# Patient Record
Sex: FEMALE | Race: WHITE | NOT HISPANIC OR LATINO | Employment: FULL TIME | URBAN - METROPOLITAN AREA
[De-identification: names, ages, dates, MRNs, and addresses within clinical notes are randomized per-mention and may not be internally consistent; named-entity substitution may affect disease eponyms.]

---

## 2024-03-06 ENCOUNTER — OFFICE VISIT (OUTPATIENT)
Dept: URGENT CARE | Facility: CLINIC | Age: 29
End: 2024-03-06

## 2024-03-06 VITALS
OXYGEN SATURATION: 98 % | WEIGHT: 293 LBS | BODY MASS INDEX: 43.4 KG/M2 | RESPIRATION RATE: 18 BRPM | SYSTOLIC BLOOD PRESSURE: 176 MMHG | HEIGHT: 69 IN | TEMPERATURE: 99 F | DIASTOLIC BLOOD PRESSURE: 98 MMHG | HEART RATE: 69 BPM

## 2024-03-06 DIAGNOSIS — K80.50 BILIARY COLIC: ICD-10-CM

## 2024-03-06 DIAGNOSIS — R10.11 RIGHT UPPER QUADRANT ABDOMINAL PAIN: Primary | ICD-10-CM

## 2024-03-06 PROCEDURE — 99213 OFFICE O/P EST LOW 20 MIN: CPT | Mod: S$GLB,,, | Performed by: FAMILY MEDICINE

## 2024-03-06 NOTE — PATIENT INSTRUCTIONS
Recommend you eat light and use ibuprofen if pain is mild. If your pain returns and is severe then would recommend you proceed to the ER for blood work and an ultrasound

## 2024-03-06 NOTE — PROGRESS NOTES
"Subjective:      Patient ID: Liam Philip is a 28 y.o. female.    Vitals:  height is 5' 8.9" (1.75 m) and weight is 136 kg (299 lb 13.2 oz). Her oral temperature is 98.5 °F (36.9 °C). Her blood pressure is 165/111 (abnormal) and her pulse is 69. Her respiration is 18 and oxygen saturation is 98%.     Chief Complaint: GI Problem    Pt states she is having GI problems that started out of nowhere an hour ago, doesn't know what could be the cause of it. The pain was very severe when it occurred and did seem to come in waves but it has since lightened up considerably. Denies nausea or vomiting or chills or fever or dysuria     GI Problem  Primary symptoms do not include nausea or vomiting. The illness began today. The problem has not changed since onset.      Gastrointestinal:  Negative for nausea and vomiting.      Objective:     Physical Exam   Constitutional: She is oriented to person, place, and time. She appears well-developed.   HENT:   Head: Normocephalic and atraumatic.   Ears:   Right Ear: External ear normal.   Left Ear: External ear normal.   Nose: Nose normal.   Mouth/Throat: Mucous membranes are normal. Mucous membranes are moist. Oropharynx is clear.   Eyes: Conjunctivae and lids are normal.   Neck: Trachea normal. Neck supple.   Cardiovascular: Normal rate, regular rhythm and normal heart sounds.   Pulmonary/Chest: Effort normal and breath sounds normal. No respiratory distress.   Abdominal: Normal appearance and bowel sounds are normal. She exhibits no distension and no mass. Soft. There is no abdominal tenderness (RUQ ttp, no masses). There is no left CVA tenderness and no right CVA tenderness.   Musculoskeletal: Normal range of motion.         General: Normal range of motion.   Neurological: She is alert and oriented to person, place, and time. She has normal strength.   Skin: Skin is warm, dry, intact, not diaphoretic and not pale.   Psychiatric: Her speech is normal and behavior is normal. Judgment and " thought content normal.   Nursing note and vitals reviewed.      Assessment:     1. Right upper quadrant abdominal pain    2. Biliary colic -possibly       Plan:       Right upper quadrant abdominal pain    Biliary colic    Pt or guardian provided educational materials and instructions regarding their visit diagnosis.